# Patient Record
Sex: FEMALE | Race: WHITE | NOT HISPANIC OR LATINO | Employment: OTHER
[De-identification: names, ages, dates, MRNs, and addresses within clinical notes are randomized per-mention and may not be internally consistent; named-entity substitution may affect disease eponyms.]

---

## 2017-03-23 ENCOUNTER — RECORDS - HEALTHEAST (OUTPATIENT)
Dept: ADMINISTRATIVE | Facility: OTHER | Age: 72
End: 2017-03-23

## 2017-03-28 ENCOUNTER — COMMUNICATION - HEALTHEAST (OUTPATIENT)
Dept: SCHEDULING | Facility: CLINIC | Age: 72
End: 2017-03-28

## 2017-03-28 DIAGNOSIS — Z91.09 ENVIRONMENTAL ALLERGIES: ICD-10-CM

## 2017-04-10 ENCOUNTER — RECORDS - HEALTHEAST (OUTPATIENT)
Dept: ADMINISTRATIVE | Facility: OTHER | Age: 72
End: 2017-04-10

## 2017-04-24 ENCOUNTER — RECORDS - HEALTHEAST (OUTPATIENT)
Dept: ADMINISTRATIVE | Facility: OTHER | Age: 72
End: 2017-04-24

## 2017-05-14 ENCOUNTER — COMMUNICATION - HEALTHEAST (OUTPATIENT)
Dept: FAMILY MEDICINE | Facility: CLINIC | Age: 72
End: 2017-05-14

## 2017-05-14 DIAGNOSIS — R35.0 URINARY FREQUENCY: ICD-10-CM

## 2017-06-26 ENCOUNTER — RECORDS - HEALTHEAST (OUTPATIENT)
Dept: ADMINISTRATIVE | Facility: OTHER | Age: 72
End: 2017-06-26

## 2017-07-12 ENCOUNTER — COMMUNICATION - HEALTHEAST (OUTPATIENT)
Dept: FAMILY MEDICINE | Facility: CLINIC | Age: 72
End: 2017-07-12

## 2017-07-12 DIAGNOSIS — K21.9 ACID REFLUX: ICD-10-CM

## 2017-07-18 ENCOUNTER — OFFICE VISIT - HEALTHEAST (OUTPATIENT)
Dept: FAMILY MEDICINE | Facility: CLINIC | Age: 72
End: 2017-07-18

## 2017-07-18 ENCOUNTER — RECORDS - HEALTHEAST (OUTPATIENT)
Dept: MAMMOGRAPHY | Facility: CLINIC | Age: 72
End: 2017-07-18

## 2017-07-18 DIAGNOSIS — N95.1 MENOPAUSAL STATE: ICD-10-CM

## 2017-07-18 DIAGNOSIS — Z12.31 ENCOUNTER FOR SCREENING MAMMOGRAM FOR MALIGNANT NEOPLASM OF BREAST: ICD-10-CM

## 2017-07-18 DIAGNOSIS — Z00.00 PHYSICAL EXAM: ICD-10-CM

## 2017-07-18 LAB
CHOLEST SERPL-MCNC: 186 MG/DL
FASTING STATUS PATIENT QL REPORTED: YES
HDLC SERPL-MCNC: 40 MG/DL
LDLC SERPL CALC-MCNC: 126 MG/DL
TRIGL SERPL-MCNC: 101 MG/DL

## 2017-07-24 ENCOUNTER — COMMUNICATION - HEALTHEAST (OUTPATIENT)
Dept: FAMILY MEDICINE | Facility: CLINIC | Age: 72
End: 2017-07-24

## 2017-09-18 ENCOUNTER — RECORDS - HEALTHEAST (OUTPATIENT)
Dept: BONE DENSITY | Facility: CLINIC | Age: 72
End: 2017-09-18

## 2017-09-18 ENCOUNTER — RECORDS - HEALTHEAST (OUTPATIENT)
Dept: ADMINISTRATIVE | Facility: OTHER | Age: 72
End: 2017-09-18

## 2017-09-18 DIAGNOSIS — N95.1 MENOPAUSAL AND FEMALE CLIMACTERIC STATES: ICD-10-CM

## 2017-09-28 ENCOUNTER — COMMUNICATION - HEALTHEAST (OUTPATIENT)
Dept: FAMILY MEDICINE | Facility: CLINIC | Age: 72
End: 2017-09-28

## 2017-09-28 DIAGNOSIS — I10 ESSENTIAL HYPERTENSION: ICD-10-CM

## 2017-10-09 ENCOUNTER — COMMUNICATION - HEALTHEAST (OUTPATIENT)
Dept: FAMILY MEDICINE | Facility: CLINIC | Age: 72
End: 2017-10-09

## 2017-10-09 DIAGNOSIS — K21.9 ACID REFLUX: ICD-10-CM

## 2018-01-20 ENCOUNTER — COMMUNICATION - HEALTHEAST (OUTPATIENT)
Dept: SCHEDULING | Facility: CLINIC | Age: 73
End: 2018-01-20

## 2018-01-25 ENCOUNTER — COMMUNICATION - HEALTHEAST (OUTPATIENT)
Dept: FAMILY MEDICINE | Facility: CLINIC | Age: 73
End: 2018-01-25

## 2018-01-25 DIAGNOSIS — K21.9 ACID REFLUX: ICD-10-CM

## 2018-02-24 ENCOUNTER — COMMUNICATION - HEALTHEAST (OUTPATIENT)
Dept: FAMILY MEDICINE | Facility: CLINIC | Age: 73
End: 2018-02-24

## 2018-02-24 DIAGNOSIS — R35.0 URINARY FREQUENCY: ICD-10-CM

## 2018-04-04 ENCOUNTER — RECORDS - HEALTHEAST (OUTPATIENT)
Dept: ADMINISTRATIVE | Facility: OTHER | Age: 73
End: 2018-04-04

## 2018-04-30 ENCOUNTER — COMMUNICATION - HEALTHEAST (OUTPATIENT)
Dept: FAMILY MEDICINE | Facility: CLINIC | Age: 73
End: 2018-04-30

## 2018-04-30 DIAGNOSIS — I10 ESSENTIAL HYPERTENSION: ICD-10-CM

## 2018-05-15 ENCOUNTER — RECORDS - HEALTHEAST (OUTPATIENT)
Dept: ADMINISTRATIVE | Facility: OTHER | Age: 73
End: 2018-05-15

## 2018-07-19 ENCOUNTER — COMMUNICATION - HEALTHEAST (OUTPATIENT)
Dept: FAMILY MEDICINE | Facility: CLINIC | Age: 73
End: 2018-07-19

## 2018-07-19 DIAGNOSIS — R35.0 URINARY FREQUENCY: ICD-10-CM

## 2018-07-27 ENCOUNTER — COMMUNICATION - HEALTHEAST (OUTPATIENT)
Dept: FAMILY MEDICINE | Facility: CLINIC | Age: 73
End: 2018-07-27

## 2018-07-27 DIAGNOSIS — K21.9 ACID REFLUX: ICD-10-CM

## 2018-08-01 ENCOUNTER — COMMUNICATION - HEALTHEAST (OUTPATIENT)
Dept: FAMILY MEDICINE | Facility: CLINIC | Age: 73
End: 2018-08-01

## 2018-08-01 DIAGNOSIS — I10 ESSENTIAL HYPERTENSION: ICD-10-CM

## 2018-08-15 ENCOUNTER — RECORDS - HEALTHEAST (OUTPATIENT)
Dept: ADMINISTRATIVE | Facility: OTHER | Age: 73
End: 2018-08-15

## 2018-09-20 ENCOUNTER — COMMUNICATION - HEALTHEAST (OUTPATIENT)
Dept: FAMILY MEDICINE | Facility: CLINIC | Age: 73
End: 2018-09-20

## 2018-09-20 DIAGNOSIS — Z91.09 ENVIRONMENTAL ALLERGIES: ICD-10-CM

## 2018-10-10 ENCOUNTER — RECORDS - HEALTHEAST (OUTPATIENT)
Dept: MAMMOGRAPHY | Facility: CLINIC | Age: 73
End: 2018-10-10

## 2018-10-10 ENCOUNTER — OFFICE VISIT - HEALTHEAST (OUTPATIENT)
Dept: FAMILY MEDICINE | Facility: CLINIC | Age: 73
End: 2018-10-10

## 2018-10-10 DIAGNOSIS — E55.9 VITAMIN D DEFICIENCY: ICD-10-CM

## 2018-10-10 DIAGNOSIS — G61.0 ACUTE INFECTIVE POLYNEURITIS (H): ICD-10-CM

## 2018-10-10 DIAGNOSIS — I10 ESSENTIAL HYPERTENSION: ICD-10-CM

## 2018-10-10 DIAGNOSIS — Z00.00 MEDICARE ANNUAL WELLNESS VISIT, SUBSEQUENT: ICD-10-CM

## 2018-10-10 DIAGNOSIS — R35.0 URINARY FREQUENCY: ICD-10-CM

## 2018-10-10 DIAGNOSIS — Z91.09 ENVIRONMENTAL ALLERGIES: ICD-10-CM

## 2018-10-10 DIAGNOSIS — Z12.31 ENCOUNTER FOR SCREENING MAMMOGRAM FOR MALIGNANT NEOPLASM OF BREAST: ICD-10-CM

## 2018-10-10 DIAGNOSIS — Z12.11 SCREEN FOR COLON CANCER: ICD-10-CM

## 2018-10-10 DIAGNOSIS — K21.9 ACID REFLUX: ICD-10-CM

## 2018-10-10 DIAGNOSIS — J30.9 ALLERGIC RHINITIS: ICD-10-CM

## 2018-10-10 LAB
ALBUMIN SERPL-MCNC: 3.6 G/DL (ref 3.5–5)
ALP SERPL-CCNC: 91 U/L (ref 45–120)
ALT SERPL W P-5'-P-CCNC: 14 U/L (ref 0–45)
ANION GAP SERPL CALCULATED.3IONS-SCNC: 12 MMOL/L (ref 5–18)
AST SERPL W P-5'-P-CCNC: 16 U/L (ref 0–40)
BILIRUB SERPL-MCNC: 0.8 MG/DL (ref 0–1)
BUN SERPL-MCNC: 16 MG/DL (ref 8–28)
CALCIUM SERPL-MCNC: 9.4 MG/DL (ref 8.5–10.5)
CHLORIDE BLD-SCNC: 103 MMOL/L (ref 98–107)
CHOLEST SERPL-MCNC: 196 MG/DL
CO2 SERPL-SCNC: 26 MMOL/L (ref 22–31)
CREAT SERPL-MCNC: 0.72 MG/DL (ref 0.6–1.1)
FASTING STATUS PATIENT QL REPORTED: NO
GFR SERPL CREATININE-BSD FRML MDRD: >60 ML/MIN/1.73M2
GLUCOSE BLD-MCNC: 84 MG/DL (ref 70–125)
HDLC SERPL-MCNC: 48 MG/DL
LDLC SERPL CALC-MCNC: 119 MG/DL
POTASSIUM BLD-SCNC: 3.3 MMOL/L (ref 3.5–5)
PROT SERPL-MCNC: 7.2 G/DL (ref 6–8)
SODIUM SERPL-SCNC: 141 MMOL/L (ref 136–145)
TRIGL SERPL-MCNC: 143 MG/DL

## 2018-10-10 RX ORDER — FEXOFENADINE HCL AND PSEUDOEPHEDRINE HCL 180; 240 MG/1; MG/1
1 TABLET, EXTENDED RELEASE ORAL DAILY
Qty: 90 TABLET | Refills: 3 | Status: SHIPPED | OUTPATIENT
Start: 2018-10-10

## 2018-10-10 RX ORDER — OXYBUTYNIN CHLORIDE 10 MG/1
10 TABLET, EXTENDED RELEASE ORAL DAILY
Qty: 90 TABLET | Refills: 3 | Status: SHIPPED | OUTPATIENT
Start: 2018-10-10

## 2018-10-10 RX ORDER — INDAPAMIDE 1.25 MG/1
1.25 TABLET ORAL DAILY
Qty: 90 TABLET | Refills: 3 | Status: SHIPPED | OUTPATIENT
Start: 2018-10-10

## 2018-10-12 LAB — 25(OH)D3 SERPL-MCNC: 53.2 NG/ML (ref 30–80)

## 2018-10-17 ENCOUNTER — COMMUNICATION - HEALTHEAST (OUTPATIENT)
Dept: FAMILY MEDICINE | Facility: CLINIC | Age: 73
End: 2018-10-17

## 2019-02-06 ENCOUNTER — COMMUNICATION - HEALTHEAST (OUTPATIENT)
Dept: FAMILY MEDICINE | Facility: CLINIC | Age: 74
End: 2019-02-06

## 2019-12-06 ENCOUNTER — COMMUNICATION - HEALTHEAST (OUTPATIENT)
Dept: FAMILY MEDICINE | Facility: CLINIC | Age: 74
End: 2019-12-06

## 2019-12-06 DIAGNOSIS — R35.0 URINARY FREQUENCY: ICD-10-CM

## 2019-12-09 ENCOUNTER — COMMUNICATION - HEALTHEAST (OUTPATIENT)
Dept: FAMILY MEDICINE | Facility: CLINIC | Age: 74
End: 2019-12-09

## 2019-12-09 DIAGNOSIS — R35.0 URINARY FREQUENCY: ICD-10-CM

## 2021-04-20 ENCOUNTER — RECORDS - HEALTHEAST (OUTPATIENT)
Dept: ADMINISTRATIVE | Facility: OTHER | Age: 76
End: 2021-04-20

## 2021-05-25 ENCOUNTER — RECORDS - HEALTHEAST (OUTPATIENT)
Dept: ADMINISTRATIVE | Facility: CLINIC | Age: 76
End: 2021-05-25

## 2021-05-27 ENCOUNTER — RECORDS - HEALTHEAST (OUTPATIENT)
Dept: ADMINISTRATIVE | Facility: CLINIC | Age: 76
End: 2021-05-27

## 2021-05-28 ENCOUNTER — RECORDS - HEALTHEAST (OUTPATIENT)
Dept: ADMINISTRATIVE | Facility: CLINIC | Age: 76
End: 2021-05-28

## 2021-05-31 VITALS — WEIGHT: 156 LBS

## 2021-06-02 VITALS — WEIGHT: 156 LBS

## 2021-06-04 NOTE — TELEPHONE ENCOUNTER
Called pt yesterday about establishing care - pt said she has moved and will find another provider.  This med will not be filled. Will be closing encounter.   Thanks.

## 2021-06-04 NOTE — TELEPHONE ENCOUNTER
Former patient of Deirdre & has not established care with another provider.  Please assign refill request to covering provider per Clinic standard process.    RN cannot approve Refill Request    RN can NOT refill this medication med is not covered by policy/route to provider     . Last office visit: Visit date not found Last Physical: Visit date not found Last MTM visit: Visit date not found Last visit same specialty: Visit date not found.  Next visit within 3 mo: Visit date not found  Next physical within 3 mo: Visit date not found      Jessica Nova, [facility] Triage/Med Refill 12/6/2019    Requested Prescriptions   Pending Prescriptions Disp Refills     oxybutynin (DITROPAN XL) 10 MG ER tablet 90 tablet 3     Sig: Take 1 tablet (10 mg total) by mouth daily.       There is no refill protocol information for this order

## 2021-06-04 NOTE — TELEPHONE ENCOUNTER
Called pt to be seen. Pt states has moved.  No longer with [facility]. Closing encounter. Thanks.

## 2021-06-11 NOTE — PROGRESS NOTES
Assessment:      Healthy female exam.    Guillan Barré syndrome  Hypertension  Neurogenic bladder     Plan:       Routine lab work will be done today.  The patient also is due for a mammogram.  This will be done today.  A DEXA scan also will be scheduled as the patient states she has never had one of these.  She will think about getting a colonoscopy in the near future.  She is unable to stay aerobically active so she will follow a good well-balanced diet.     Subjective:      Rashida Ruby is a 71 y.o. female who presents for an annual exam. The patient is not sexually active. The patient participates in regular exercise: no. The patient reports that there is not domestic violence in her life.  Overall, she seems to be feeling quite well.  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  She has significant lower extremity weakness.  She also has a neurogenic bladder secondary to this.  She self catheterizes.  She is due to have a bone density test.  She is due to have a mammogram today as well.    Healthy Habits:   Regular Exercise: No  Sunscreen Use: Yes  Healthy Diet: Yes  Dental Visits Regularly: Yes  Seat Belt: Yes  Sexually active: No  Self Breast Exam Monthly:No  Hemoccults: No  Flex Sig: No  Colonoscopy: No  Lipid Profile: Yes  Glucose Screen: Yes  Prevention of Osteoporosis: Yes        There is no immunization history on file for this patient.  Immunization status: up to date and documented.    No exam data present    Gynecologic History  No LMP recorded. Patient is postmenopausal.  Contraception: none  Last mammogram: . Results were: normal      OB History    Para Term  AB Living     0      SAB TAB Ectopic Multiple Live Births                    Current Outpatient Prescriptions   Medication Sig Dispense Refill     aspirin 325 MG tablet Take 650 mg by mouth daily.       calcium-vitamin D 250-100 mg-unit per tablet Take 2 tablets by mouth 2 (two) times a day.        cholecalciferol, vitamin D3, (VITAMIN D3) 2,000 unit cap Take 1 capsule by mouth daily.       CRANBERRY CONC/ASCORBIC ACID (SUPER CRANBERRY ORAL) Take 1 capsule by mouth daily.       fexofenadine-pseudoephedrine (ALLEGRA-D 24 HOUR) 180-240 mg per 24 hr tablet Take 1 tablet by mouth daily. 90 tablet 1     indapamide (LOZOL) 1.25 MG tablet TAKE 1 TABLET (1.25 MG TOTAL) BY MOUTH DAILY. 90 tablet 3     L. ACIDOPHILUS/BIFIDO LONGUM (PROBIOTIC PEARLS ORAL) Take 1 tablet by mouth daily.       multivitamin therapeutic (THERAGRAN) tablet Take 1 tablet by mouth daily.       omeprazole (PRILOSEC) 20 MG capsule TAKE ONE CAPSULE BY MOUTH EVERY DAY(NOAMMERS ONLY) 90 capsule 0     oxybutynin (DITROPAN XL) 10 MG ER tablet TAKE 1 TABLET (10 MG TOTAL) BY MOUTH DAILY. 90 tablet 2     No current facility-administered medications for this visit.      No past medical history on file.  Past Surgical History:   Procedure Laterality Date     OOPHORECTOMY       CO APPENDECTOMY      Description: Appendectomy;  Recorded: 04/03/2013;     CO OPEN TX DISTAL FIBULAR FRACTURE LAT MALLEOLUS      Description: Open Treatment Of Fracture Of The Lateral Malleolus;  Recorded: 09/22/2009;     CO REMOVAL OF OVARY(S)      Description: Oophorectomy;  Recorded: 09/22/2009;     CO REMOVAL OF TONSILS,<11 Y/O      Description: Tonsillectomy;  Recorded: 09/22/2009;     Penicillins  Family History   Problem Relation Age of Onset     Breast cancer Neg Hx      Social History     Social History     Marital status: Single     Spouse name: N/A     Number of children: N/A     Years of education: N/A     Occupational History     Not on file.     Social History Main Topics     Smoking status: Former Smoker     Smokeless tobacco: Never Used     Alcohol use 4.2 oz/week     7 Glasses of wine per week      Comment: a glass of red wine every night     Drug use: No     Sexual activity: No     Other Topics Concern     Not on file     Social History Narrative       Review of  Systems  Review of Systems   Constitutional: Negative.  Negative for fatigue and fever.   HENT: Negative.  Negative for congestion.    Eyes: Negative.    Respiratory: Negative.  Negative for cough and shortness of breath.    Cardiovascular: Negative.  Negative for chest pain.   Gastrointestinal: Negative.  Negative for constipation and diarrhea.   Endocrine: Negative.  Negative for polydipsia and polyuria.   Genitourinary: Positive for difficulty urinating.        Patient self catheterizes   Musculoskeletal: Negative.    Skin: Negative.    Allergic/Immunologic: Negative.    Neurological:        Patient has weakness in extremities secondary to Guillan Barré syndrome   Hematological: Negative.    Psychiatric/Behavioral: Negative.              Objective:         Vitals:    07/18/17 0951   BP: 134/72   Pulse: 68   Resp: 12   Temp: 97.8  F (36.6  C)   TempSrc: Oral   Weight: 156 lb (70.8 kg)     There is no height or weight on file to calculate BMI.    Physical  Physical Exam   Constitutional: She is oriented to person, place, and time. She appears well-developed and well-nourished. No distress.   HENT:   Right Ear: External ear normal.   Left Ear: External ear normal.   Mouth/Throat: Oropharynx is clear and moist.   Eyes: Conjunctivae and EOM are normal. Pupils are equal, round, and reactive to light.   Mild cataract noted bilaterally.  Floaters also seen in both eyes.   Neck: Normal range of motion. Neck supple. No JVD present. No thyromegaly present.   Cardiovascular: Normal rate, regular rhythm and normal heart sounds.    No murmur heard.  Pulmonary/Chest: Effort normal and breath sounds normal. No respiratory distress.   Abdominal: Bowel sounds are normal. She exhibits no mass. There is no tenderness.   Musculoskeletal: She exhibits edema.   Patient has weak left hand grasp.  She also has weak lower extremities in the hip flexors and extensors.   Lymphadenopathy:     She has no cervical adenopathy.   Neurological:  She is alert and oriented to person, place, and time. She has normal reflexes. No cranial nerve deficit.   Skin: Skin is warm and dry. No rash noted.   Psychiatric: She has a normal mood and affect.

## 2021-06-20 NOTE — PROGRESS NOTES
Assessment and Plan:     1.  Annual wellness visit  2.  Essential hypertension in good control  3.  History of Guyon Barré syndrome  4.  Allergic rhinitis  5.  Vitamin D deficiency  6.  Right shoulder pain consistent with mild acromioclavicular strain secondary to overuse syndrome      The patient's current medical problems were reviewed.      The following health maintenance schedule was reviewed with the patient and provided in printed form in the after visit summary:   Health Maintenance   Topic Date Due     TD 18+ HE  08[birth year]24/1963     ADVANCE DIRECTIVES DISCUSSED WITH PATIENT  08/24/1965     COLONOSCOPY  08/24/1997     ZOSTER VACCINE  08/24/2007     PNEUMOCOCCAL POLYSACCHARIDE VACCINE AGE 65 AND OVER  08/24/2012     PNEUMOCOCCAL CONJUGATE VACCINE FOR ADULTS (PCV13 OR PREVNAR)  08/24/2012     FALL RISK ASSESSMENT  07/18/2018     INFLUENZA VACCINE RULE BASED (1) 08/01/2018     MAMMOGRAM  07/18/2019     DXA SCAN  09/18/2019        Subjective:   Chief Complaint: Rashida Ruby is an 73 y.o. female here for an Annual Wellness visit.   HPI: Patient comes in today for an annual wellness visit.  She actually is feeling fairly well.  Her biggest health concern is that she has been having some right shoulder pain.  This is been bothering her over the last few months.  Occasionally it will click with a range of motion exercise and it will feel better for a while.  Pain seems to be in the posterior part of the shoulder.  She has been doing a lot more lifting and reaching and overuse with the right arm.  She feels like that is when the pains got worse.  It is difficult to reach up behind her back when she has pain.  She recently moved.  She now lives in [place].  She will be changing clinics to go to the [facility].  She needs a mammogram and that will be updated today.  She would like to do the stool test for colon cancer screening.    Review of Systems:    Please see above.  The rest of the review of systems  are negative for all systems.    Patient Care Team:  Ozzy Mcmanus MD as PCP - General  Albina Jane MD (Dermatology)     Patient Active Problem List   Diagnosis     Allergic Rhinitis     Essential Hypertension     Neurogenic Bladder     Angioedema     Vitamin D Deficiency     Guillain-[place] Syndrome     Incomplete Emptying Of Bladder     No past medical history on file.   Past Surgical History:   Procedure Laterality Date     OOPHORECTOMY       SD APPENDECTOMY      Description: Appendectomy;  Recorded: 04/03/2013;     SD OPEN TX DISTAL FIBULAR FRACTURE LAT MALLEOLUS      Description: Open Treatment Of Fracture Of The Lateral Malleolus;  Recorded: 09/22/2009;     SD REMOVAL OF OVARY(S)      Description: Oophorectomy;  Recorded: 09/22/2009;     SD REMOVAL OF TONSILS,<13 Y/O      Description: Tonsillectomy;  Recorded: 09/22/2009;      Family History   Problem Relation Age of Onset     Breast cancer Neg Hx       Social History     Social History     Marital status: Single     Spouse name: N/A     Number of children: N/A     Years of education: N/A     Occupational History     Not on file.     Social History Main Topics     Smoking status: Former Smoker     Smokeless tobacco: Never Used     Alcohol use 4.2 oz/week     7 Glasses of wine per week      Comment: a glass of red wine every night     Drug use: No     Sexual activity: No     Other Topics Concern     Not on file     Social History Narrative      Current Outpatient Prescriptions   Medication Sig Dispense Refill     aspirin 325 MG tablet Take 650 mg by mouth daily.       calcium-vitamin D 250-100 mg-unit per tablet Take 2 tablets by mouth 2 (two) times a day.       cholecalciferol, vitamin D3, (VITAMIN D3) 2,000 unit cap Take 1 capsule by mouth daily.       CRANBERRY CONC/ASCORBIC ACID (SUPER CRANBERRY ORAL) Take 1 capsule by mouth daily.       fexofenadine-pseudoephedrine (ALLEGRA-D 24 HOUR) 180-240 mg per 24 hr tablet Take 1 tablet by mouth daily. 90  tablet 3     indapamide (LOZOL) 1.25 MG tablet Take 1 tablet (1.25 mg total) by mouth daily. 90 tablet 3     L. ACIDOPHILUS/BIFIDO LONGUM (PROBIOTIC PEARLS ORAL) Take 1 tablet by mouth daily.       multivitamin therapeutic (THERAGRAN) tablet Take 1 tablet by mouth daily.       omeprazole (PRILOSEC) 20 MG capsule TAKE ONE CAPSULE BY MOUTH EVERY DAY(MUST BE KRECuriously BRAND) 90 capsule 3     oxybutynin (DITROPAN XL) 10 MG ER tablet Take 1 tablet (10 mg total) by mouth daily. 90 tablet 3     No current facility-administered medications for this visit.       Objective:   Vital Signs:   Visit Vitals     /72     Pulse 76     Temp 98.4  F (36.9  C) (Oral)     Resp 16     Wt 156 lb (70.8 kg)     SpO2 97%        VisionScreening:  No exam data present     PHYSICAL EXAM  HEENT: Pupils equally round and reactive to light.  Fundi are benign.  Both TMs are gray.  Pharynx is clear.  Neck: No thyromegaly present.  No lymphadenopathy noted.  No increased JVD noted.  No posterior cervical tenderness present.  Lungs: Lungs are clear.  Patient is in no respiratory distress today.  Cardiac: There is a regular rhythm present.  No ectopy or murmur heard.  Pulses are strong in upper extremities.  Abdomen: Abdomen is obese but soft.  It is nontender.  No rebound, guarding or rigidity present.  Bowel sounds are active throughout.  Musculoskeletal: Hand grasp is strong.  The patient has mild pain over the acromioclavicular joint on palpation.  No crepitus present.  The trapezius muscle also is tender distally in the right shoulder area.  Lower extremities are weak.  She has minimal muscle strength to antigravity muscles.  She is sitting in the wheelchair during my examination today.  1+ edema noted bilaterally.  Neurologic: Patient is alert and oriented.  Cranial nerves all appear intact.  Motor strength of the upper extremities appears equal and strong.  Lower extremity muscles are definitely weak.    Assessment Results 10/10/2018    Activities of Daily Living 1 - Full function   Instrumental Activities of Daily Living No help needed   Mini Cog Total Score 5   Some recent data might be hidden     A Mini-Cog score of 0-2 suggests the possibility of dementia, score of 3-5 suggests no dementia    Identified Health Risks:     She is at risk for lack of exercise and has been provided with information to increase physical activity for the benefit of her well-being.  The patient was counseled and encouraged to consider modifying their diet and eating habits. She was provided with information on recommended healthy diet options.  The patient reports that she has difficulty with activities of daily living. I have asked that the patient make a follow up appointment in 4 weeks where this issue will be further evaluated and addressed.  She is at risk for falling and has been provided with information to reduce the risk of falling at home.  Patient's advanced directive was discussed and I am comfortable with the patient's wishes.

## 2021-06-23 NOTE — TELEPHONE ENCOUNTER
The patient is due to establish care with a new primary care provider as Dr. Ozzy Mcmanus is no longer practicing at the [facility]. CMT attempting to reach the patient to schedule him/her with a new PCP at [facility].   The patient has moved to [place] and will be seeing a provider there.

## 2021-07-13 ENCOUNTER — RECORDS - HEALTHEAST (OUTPATIENT)
Dept: ADMINISTRATIVE | Facility: CLINIC | Age: 76
End: 2021-07-13

## 2021-07-21 ENCOUNTER — RECORDS - HEALTHEAST (OUTPATIENT)
Dept: ADMINISTRATIVE | Facility: CLINIC | Age: 76
End: 2021-07-21

## 2021-08-01 ENCOUNTER — HEALTH MAINTENANCE LETTER (OUTPATIENT)
Age: 76
End: 2021-08-01

## 2021-09-26 ENCOUNTER — HEALTH MAINTENANCE LETTER (OUTPATIENT)
Age: 76
End: 2021-09-26

## 2022-08-27 ENCOUNTER — HEALTH MAINTENANCE LETTER (OUTPATIENT)
Age: 77
End: 2022-08-27

## 2023-04-23 ENCOUNTER — HEALTH MAINTENANCE LETTER (OUTPATIENT)
Age: 78
End: 2023-04-23

## 2023-08-15 ENCOUNTER — TRANSFERRED RECORDS (OUTPATIENT)
Dept: HEALTH INFORMATION MANAGEMENT | Facility: CLINIC | Age: 78
End: 2023-08-15
Payer: COMMERCIAL

## 2023-08-22 ENCOUNTER — HOSPITAL ENCOUNTER (OUTPATIENT)
Facility: HOSPITAL | Age: 78
End: 2023-08-22
Attending: OBSTETRICS & GYNECOLOGY | Admitting: OBSTETRICS & GYNECOLOGY
Payer: COMMERCIAL

## 2023-09-23 ENCOUNTER — HEALTH MAINTENANCE LETTER (OUTPATIENT)
Age: 78
End: 2023-09-23